# Patient Record
Sex: FEMALE | Race: WHITE | NOT HISPANIC OR LATINO | Employment: FULL TIME | URBAN - METROPOLITAN AREA
[De-identification: names, ages, dates, MRNs, and addresses within clinical notes are randomized per-mention and may not be internally consistent; named-entity substitution may affect disease eponyms.]

---

## 2017-08-17 ENCOUNTER — OFFICE VISIT (OUTPATIENT)
Dept: URGENT CARE | Facility: CLINIC | Age: 32
End: 2017-08-17
Payer: COMMERCIAL

## 2017-08-17 VITALS
BODY MASS INDEX: 32.6 KG/M2 | OXYGEN SATURATION: 96 % | DIASTOLIC BLOOD PRESSURE: 78 MMHG | HEART RATE: 78 BPM | SYSTOLIC BLOOD PRESSURE: 116 MMHG | TEMPERATURE: 97.9 F | RESPIRATION RATE: 16 BRPM | WEIGHT: 190 LBS

## 2017-08-17 DIAGNOSIS — R21 RASH: ICD-10-CM

## 2017-08-17 PROCEDURE — 99203 OFFICE O/P NEW LOW 30 MIN: CPT | Performed by: PHYSICIAN ASSISTANT

## 2017-08-17 RX ORDER — HYDROXYZINE HYDROCHLORIDE 25 MG/1
25 TABLET, FILM COATED ORAL 3 TIMES DAILY PRN
Qty: 30 TAB | Refills: 0 | Status: SHIPPED | OUTPATIENT
Start: 2017-08-17 | End: 2018-04-10

## 2017-08-17 RX ORDER — METHYLPREDNISOLONE 4 MG/1
TABLET ORAL
Qty: 21 TAB | Refills: 0 | Status: SHIPPED | OUTPATIENT
Start: 2017-08-17 | End: 2018-04-10

## 2017-08-17 ASSESSMENT — ENCOUNTER SYMPTOMS
COUGH: 0
SHORTNESS OF BREATH: 0
DIARRHEA: 0
FOCAL WEAKNESS: 0
PALPITATIONS: 0
SENSORY CHANGE: 0
NAUSEA: 0
SORE THROAT: 0
HEADACHES: 0
CHILLS: 0
MYALGIAS: 0
EYE PAIN: 0
VOMITING: 0
FATIGUE: 0
FEVER: 0
RHINORRHEA: 0
ANOREXIA: 0
TINGLING: 0

## 2017-08-17 NOTE — PROGRESS NOTES
Subjective:      Tamar Reyes is a 32 y.o. female who presents with Other            Rash  This is a new problem. Episode onset: 3-4 days ago. Patient went camping this past weekned. She got bit by mosquitoes. Has noticed more bumps developing over the past 3 days. The problem has been gradually worsening since onset. The rash is diffuse. The rash is characterized by itchiness and redness. She was exposed to an insect bite/sting. Pertinent negatives include no anorexia, congestion, cough, diarrhea, eye pain, facial edema, fatigue, fever, joint pain, rhinorrhea, shortness of breath, sore throat or vomiting. Past treatments include antihistamine. The treatment provided mild relief. There is no history of allergies, asthma, eczema or varicella.     No past medical history on file.  No past surgical history on file.    No family history on file.    Allergies   Allergen Reactions   • Penicillins Swelling       Medications, Allergies, and current problem list reviewed today in Epic      Review of Systems   Constitutional: Negative for fever, chills, malaise/fatigue and fatigue.   HENT: Negative for congestion, ear discharge, ear pain, rhinorrhea and sore throat.    Eyes: Negative for pain.   Respiratory: Negative for cough and shortness of breath.    Cardiovascular: Negative for chest pain, palpitations and leg swelling.   Gastrointestinal: Negative for nausea, vomiting, diarrhea and anorexia.   Musculoskeletal: Negative for myalgias and joint pain.   Skin: Positive for itching and rash.   Neurological: Negative for tingling, sensory change, focal weakness and headaches.     All other systems reviewed and are negative.        Objective:     /78 mmHg  Pulse 78  Temp(Src) 36.6 °C (97.9 °F)  Resp 16  Wt 86.183 kg (190 lb)  SpO2 96%     Physical Exam   Constitutional: She is oriented to person, place, and time. She appears well-developed and well-nourished. No distress.   HENT:   Head: Normocephalic and  atraumatic.   Mouth/Throat: Oropharynx is clear and moist.   Eyes: Conjunctivae are normal. No scleral icterus.   Pulmonary/Chest: Effort normal. No respiratory distress. She has no wheezes. She has no rales.   Neurological: She is alert and oriented to person, place, and time. No cranial nerve deficit.   Skin: Rash (diffuse maculpapular rash noted on hands, legs, arms, face, chest) noted. Rash is maculopapular.   Psychiatric: She has a normal mood and affect. Her behavior is normal. Judgment and thought content normal.               Assessment/Plan:     1. Rash  - most likely allergic dermatitis related to mosquito bites or possible exposure (poison oak). Rash not c/w scabies, bed bugs, or tick borne illness.  - MethylPREDNISolone (MEDROL DOSEPAK) 4 MG Tablet Therapy Pack; Take as directed on package. 1 pack.  Dispense: 21 Tab; Refill: 0  - hydrOXYzine (ATARAX) 25 MG Tab; Take 1 Tab by mouth 3 times a day as needed for Itching.  Dispense: 30 Tab; Refill: 0  Sedation side effects discussed. No Driving or alcohol with medication given.     Differential diagnoses, Supportive care, and indications for immediate follow-up discussed with patient.   Instructed to return to clinic or nearest emergency department for any change in condition, further concerns, or worsening of symptoms.    The patient demonstrated a good understanding and agreed with the treatment plan.    Rashmi Smith PA-C

## 2017-08-17 NOTE — MR AVS SNAPSHOT
Tamar Reyes   2017 10:15 AM   Office Visit   MRN: [id number]    Department:  [facility]   Dept Phone:  [phone]    Description:  Female : 1985   Provider:  Rashmi Smith PA-C           Reason for Visit     Other bumps over whole body X 4 days       Allergies as of 2017     Allergen Noted Reactions    Penicillins 2012   Swelling      You were diagnosed with     Rash   [[id number]]         Vital Signs     Blood Pressure Pulse Temperature Respirations Weight Oxygen Saturation    116/78 mmHg 78 36.6 °C (97.9 °F) 16 86.183 kg (190 lb) 96%      Basic Information     Date Of Birth Sex Race Ethnicity Preferred Language    1985 Female White Non- English      Health Maintenance        Date Due Completion Dates    IMM DTaP/Tdap/Td Vaccine (1 - Tdap) 2004 ---    PAP SMEAR 2006 ---    IMM INFLUENZA (1) 2017 ---            Current Immunizations     No immunizations on file.      Below and/or attached are the medications your provider expects you to take. Review all of your home medications and newly ordered medications with your provider and/or pharmacist. Follow medication instructions as directed by your provider and/or pharmacist. Please keep your medication list with you and share with your provider. Update the information when medications are discontinued, doses are changed, or new medications (including over-the-counter products) are added; and carry medication information at all times in the event of emergency situations     Allergies:  PENICILLINS - Swelling               Medications  Valid as of: 2017 - 11:10 AM    Generic Name Brand Name Tablet Size Instructions for use    Dicyclomine HCl (Tab) BENTYL 20 MG Take 1 Tab by mouth every 6 hours.        HydrOXYzine HCl (Tab) ATARAX 25 MG Take 1 Tab by mouth 3 times a day as needed for Itching.        MethylPREDNISolone (Tablet Therapy Pack) MEDROL DOSEPAK 4 MG Take as directed on package. 1  pack.        Norethindrone Acet-Ethinyl Est (Tab) JUNEL 1.5/30 1.5-30 MG-MCG TAKE 1 TABLET BY MOUTH EVERY DAY        Ondansetron HCl (Tab) ZOFRAN 4 MG Take 1 Tab by mouth every 6 hours.        .                 Medicines prescribed today were sent to:     [id number] - [place], [place] - [address]    [address] [place] [place] [address]    Phone: [phone] Fax: [phone]    Open 24 Hours?: No      Medication refill instructions:       If your prescription bottle indicates you have medication refills left, it is not necessary to call your provider’s office. Please contact your pharmacy and they will refill your medication.    If your prescription bottle indicates you do not have any refills left, you may request refills at any time through one of the following ways: The online [employer] system (except Urgent Care), by calling your provider’s office, or by asking your pharmacy to contact your provider’s office with a refill request. Medication refills are processed only during regular business hours and may not be available until the next business day. Your provider may request additional information or to have a follow-up visit with you prior to refilling your medication.   *Please Note: Medication refills are assigned a new Rx number when refilled electronically. Your pharmacy may indicate that no refills were authorized even though a new prescription for the same medication is available at the pharmacy. Please request the medicine by name with the pharmacy before contacting your provider for a refill.           [employer] Status: Patient Declined

## 2017-08-27 ENCOUNTER — APPOINTMENT (OUTPATIENT)
Dept: RADIOLOGY | Facility: IMAGING CENTER | Age: 32
End: 2017-08-27
Attending: PHYSICIAN ASSISTANT
Payer: COMMERCIAL

## 2017-08-27 ENCOUNTER — OFFICE VISIT (OUTPATIENT)
Dept: URGENT CARE | Facility: CLINIC | Age: 32
End: 2017-08-27
Payer: COMMERCIAL

## 2017-08-27 VITALS
DIASTOLIC BLOOD PRESSURE: 64 MMHG | SYSTOLIC BLOOD PRESSURE: 112 MMHG | HEIGHT: 64 IN | BODY MASS INDEX: 32.44 KG/M2 | OXYGEN SATURATION: 97 % | RESPIRATION RATE: 14 BRPM | TEMPERATURE: 98.6 F | HEART RATE: 77 BPM | WEIGHT: 190 LBS

## 2017-08-27 DIAGNOSIS — S89.92XA LEFT KNEE INJURY, INITIAL ENCOUNTER: ICD-10-CM

## 2017-08-27 DIAGNOSIS — S86.912A KNEE STRAIN, LEFT, INITIAL ENCOUNTER: Primary | ICD-10-CM

## 2017-08-27 PROCEDURE — 73564 X-RAY EXAM KNEE 4 OR MORE: CPT | Mod: TC | Performed by: PHYSICIAN ASSISTANT

## 2017-08-27 PROCEDURE — 99214 OFFICE O/P EST MOD 30 MIN: CPT | Performed by: PHYSICIAN ASSISTANT

## 2017-08-27 ASSESSMENT — ENCOUNTER SYMPTOMS
SENSORY CHANGE: 0
NUMBNESS: 0
FOCAL WEAKNESS: 0
TINGLING: 0

## 2017-08-27 NOTE — PATIENT INSTRUCTIONS
Knee Sprain  A knee sprain is a tear in one of the strong, fibrous tissues that connect the bones (ligaments) in your knee. The severity of the sprain depends on how much of the ligament is torn. The tear can be either partial or complete.  CAUSES   Often, sprains are a result of a fall or injury. The force of the impact causes the fibers of your ligament to stretch too much. This excess tension causes the fibers of your ligament to tear.  SIGNS AND SYMPTOMS   You may have some loss of motion in your knee. Other symptoms include:  · Bruising.  · Pain in the knee area.  · Tenderness of the knee to the touch.  · Swelling.  DIAGNOSIS   To diagnose a knee sprain, your health care provider will physically examine your knee. Your health care provider may also suggest an X-ray exam of your knee to make sure no bones are broken.  TREATMENT   If your ligament is only partially torn, treatment usually involves keeping the knee in a fixed position (immobilization) or bracing your knee for activities that require movement for several weeks. To do this, your health care provider will apply a bandage, cast, or splint to keep your knee from moving and to support your knee during movement until it heals. For a partially torn ligament, the healing process usually takes 4-6 weeks.  If your ligament is completely torn, depending on which ligament it is, you may need surgery to reconnect the ligament to the bone or reconstruct it. After surgery, a cast or splint may be applied and will need to stay on your knee for 4-6 weeks while your ligament heals.  HOME CARE INSTRUCTIONS  · Keep your injured knee elevated to decrease swelling.  · To ease pain and swelling, apply ice to the injured area:  ¨ Put ice in a plastic bag.  ¨ Place a towel between your skin and the bag.  ¨ Leave the ice on for 20 minutes, 2-3 times a day.  · Only take medicine for pain as directed by your health care provider.  · Do not leave your knee unprotected until  pain and stiffness go away (usually 4-6 weeks).  · If you have a cast or splint, do not allow it to get wet. If you have been instructed not to remove it, cover it with a plastic bag when you shower or bathe. Do not swim.  · Your health care provider may suggest exercises for you to do during your recovery to prevent or limit permanent weakness and stiffness.  SEEK IMMEDIATE MEDICAL CARE IF:  · Your cast or splint becomes damaged.  · Your pain becomes worse.  · You have significant pain, swelling, or numbness below the cast or splint.  MAKE SURE YOU:  · Understand these instructions.  · Will watch your condition.  · Will get help right away if you are not doing well or get worse.     This information is not intended to replace advice given to you by your health care provider. Make sure you discuss any questions you have with your health care provider.     Document Released: 12/18/2006 Document Revised: 01/08/2016 Document Reviewed: 07/30/2014  [employer] Interactive Patient Education ©2016 [employer]

## 2017-08-27 NOTE — PROGRESS NOTES
"Subjective:      Tamar Reyes is a 32 y.o. female who presents with Leg Injury ((L) leg x 1 day)            Patient presents with:  Leg Injury: (L) leg x 1 day          Knee Injury   This is a new problem. The current episode started yesterday. The problem occurs constantly. The problem has been gradually worsening. Pertinent negatives include no numbness. The symptoms are aggravated by bending, exertion, standing and walking. She has tried ice, position changes and rest for the symptoms. The treatment provided no relief.       Review of Systems   Musculoskeletal: Positive for joint pain (left knee).   Neurological: Negative for tingling, sensory change, focal weakness and numbness.   All other systems reviewed and are negative.         Objective:     /64   Pulse 77   Temp 37 °C (98.6 °F)   Resp 14   Ht 1.626 m (5' 4\")   Wt 86.2 kg (190 lb)   SpO2 97%   BMI 32.61 kg/m²      Physical Exam   Constitutional: She is oriented to person, place, and time. She appears well-developed and well-nourished. No distress.   HENT:   Head: Normocephalic and atraumatic.   Nose: Nose normal.   Eyes: Conjunctivae and EOM are normal. Pupils are equal, round, and reactive to light.   Neck: Normal range of motion. Neck supple.   Cardiovascular: Normal rate and intact distal pulses.    Pulmonary/Chest: Effort normal.   Musculoskeletal:        Left knee: She exhibits decreased range of motion (secondary to pain) and swelling. She exhibits no effusion, normal alignment, no LCL laxity, normal patellar mobility and no MCL laxity. Tenderness found. Lateral joint line tenderness noted. No patellar tendon tenderness noted.        Legs:  Neurological: She is alert and oriented to person, place, and time.   Skin: Skin is warm and dry.   Psychiatric: She has a normal mood and affect.   Nursing note and vitals reviewed.         Xray images viewed and interpreted by me, confirmed by radiology:    Neg for acute fracture, dislocation, " soft tissue swelling.        Assessment/Plan:     1. Knee strain, left, initial encounter  DX-KNEE COMPLETE 4+ LEFT    REFERRAL TO SPORTS MEDICINE     ACE wrap placed on left knee.     RICE TREATMENT FOR EXTREMITY INJURIES:  R-rest the extremity as much as possible while pain and swelling persist  I-ice the extremity 15 minutes every 2 hours for the first 24 hours, then 4-5 times daily   C-compress the extremity either with splint or ace wrap as directed  E-elevate the extremity to help with swelling    Motrin/Advil/Ibuprophen 600 mg every 6 hours as needed for pain or fever.    PT should follow up with PCP in 1-2 days for re-evaluation if symptoms have not improved.  Discussed red flags and reasons to return to UC or ED.  Pt and/or family verbalized understanding of diagnosis and follow up instructions and was given informational handout on diagnosis.  PT discharged.

## 2017-09-01 ENCOUNTER — OFFICE VISIT (OUTPATIENT)
Dept: MEDICAL GROUP | Facility: CLINIC | Age: 32
End: 2017-09-01
Payer: COMMERCIAL

## 2017-09-01 VITALS
HEIGHT: 64 IN | WEIGHT: 190 LBS | DIASTOLIC BLOOD PRESSURE: 68 MMHG | TEMPERATURE: 98.2 F | OXYGEN SATURATION: 96 % | BODY MASS INDEX: 32.44 KG/M2 | HEART RATE: 87 BPM | RESPIRATION RATE: 18 BRPM | SYSTOLIC BLOOD PRESSURE: 114 MMHG

## 2017-09-01 DIAGNOSIS — M25.562 LEFT LATERAL KNEE PAIN: ICD-10-CM

## 2017-09-01 PROCEDURE — 99203 OFFICE O/P NEW LOW 30 MIN: CPT | Performed by: FAMILY MEDICINE

## 2017-09-01 ASSESSMENT — PATIENT HEALTH QUESTIONNAIRE - PHQ9: CLINICAL INTERPRETATION OF PHQ2 SCORE: 0

## 2017-09-01 NOTE — PROGRESS NOTES
CHIEF COMPLAINT:  Tamar Reyes female presenting at the request of JAYLA Colón  for evaluation of knee pain.     Tamar Reyes is complaining of left knee pain  present for 1 weeks (DOI, August 26th while hiking), LEFT knee slipped going down hill landing on her right side and felt a sudden pop at the LEFT lateral knee  Pain is at the anterolateral knee  Quality is aching  Pain is non-radiating   Improved with resting  Aggravated by movement, walking, stairs  no prior problems with this area in the past   Prior Treatments: Seen at Urgent Care  Prior studies: X-Ray   Medications tried for pain include: narcotic analgesic TRAMADOL which helped some  Mechanical Symptom history: No Locking, chronic issue with knee hyperextension, otherwise no prior knee problems    REVIEW OF SYSTEMS  No Nausea, No Vomiting, No Chest Pain, No Shortness of Breath, No Dizziness, No Headache      PAST MEDICAL HISTORY:   History reviewed. No pertinent past medical history.    PMH:  has no past medical history on file.  MEDS:   Current Outpatient Prescriptions:   •  MethylPREDNISolone (MEDROL DOSEPAK) 4 MG Tablet Therapy Pack, Take as directed on package. 1 pack., Disp: 21 Tab, Rfl: 0  •  hydrOXYzine (ATARAX) 25 MG Tab, Take 1 Tab by mouth 3 times a day as needed for Itching., Disp: 30 Tab, Rfl: 0  •  ondansetron (ZOFRAN) 4 MG TABS, Take 1 Tab by mouth every 6 hours., Disp: 15 Tab, Rfl: 0  •  dicyclomine (BENTYL) 20 MG TABS, Take 1 Tab by mouth every 6 hours., Disp: 20 Tab, Rfl: 0  •  JUNEL 1.5/30 1.5-30 MG-MCG TABS, TAKE 1 TABLET BY MOUTH EVERY DAY, Disp: 90 Each, Rfl: 0  ALLERGIES:   Allergies   Allergen Reactions   • Penicillins Swelling     SURGHX: No past surgical history on file.  SOCHX:  reports that she has never smoked. She has never used smokeless tobacco.  FH: Family history was reviewed, no pertinent findings to report     PHYSICAL EXAM:  /68   Pulse 87   Temp 36.8 °C (98.2 °F)   Resp 18   Ht 1.626 m  "(5' 4\")   Wt 86.2 kg (190 lb)   SpO2 96%   BMI 32.61 kg/m²       well-developed, well-nourished in no apparent distress, alert and oriented x 3.  Gait: minimally antalgic     RIGHT Knee:  Slight Varus and No Swelling  Range of Motion Intact  Trace effusion  Patellar No tenderness and no apprehension  Medial Joint Line Tenderness and NEGATIVE Love  Lateral Joint Line Non-tender and NEGATIVE Love  Trace Laxity with Varus stress  Trace Laxity with Valgus stress  Lachman's testing is Trace  Posterior Drawer Testing is Trace  The leg is otherwise neurovascularly intact    LEFT Knee:  Slight Varus and No Swelling   Range of Motion Intact  Trace effusion  Patellar No tenderness and no apprehension  Medial Joint Line Tenderness and NEGATIVE Love  Lateral Joint Line Tenderness and NEGATIVE Love  Trace Laxity with Varus stress  Trace Laxity with Valgus stress  Lachman's testing is Trace  Posterior Drawer Testing is Trace  The leg is otherwise neurovascularly intact    Additional Findings: Tight hamstrings      1. Left lateral knee pain       Patient slipped while hiking, slipped down hill on an extended left knee and landed on her right side causing a valgus stress to her knee feeling a pop and pain in the lateral knee  Normal dialed testing in the office today  Positive tenderness along the lateral knee/distal ITB region  There does not appear to be any laxity to lateral collateral ligament    She seems to be improving  The plan is to have her continue bracing and follow-up in one week to see how she is doing  Consider formal physical therapy if symptoms persist    Hopefully we can get a better evaluation when she is having less pain    Return in about 2 weeks (around 9/15/2017).                                                      Results for orders placed in visit on 08/27/17   DX-KNEE COMPLETE 4+ LEFT    Impression 1. No acute osseous abnormality.                                          reviewed by me " with patient     Thank JAYLA Ruiz  for allowing me to participate in caring for the patient.

## 2017-09-15 ENCOUNTER — OFFICE VISIT (OUTPATIENT)
Dept: MEDICAL GROUP | Facility: CLINIC | Age: 32
End: 2017-09-15
Payer: COMMERCIAL

## 2017-09-15 VITALS
RESPIRATION RATE: 18 BRPM | OXYGEN SATURATION: 95 % | SYSTOLIC BLOOD PRESSURE: 118 MMHG | WEIGHT: 190 LBS | BODY MASS INDEX: 32.44 KG/M2 | HEIGHT: 64 IN | TEMPERATURE: 98.1 F | DIASTOLIC BLOOD PRESSURE: 76 MMHG | HEART RATE: 83 BPM

## 2017-09-15 DIAGNOSIS — M25.562 LEFT LATERAL KNEE PAIN: ICD-10-CM

## 2017-09-15 DIAGNOSIS — M62.81 QUADRICEPS WEAKNESS: ICD-10-CM

## 2017-09-15 PROCEDURE — 99213 OFFICE O/P EST LOW 20 MIN: CPT | Performed by: FAMILY MEDICINE

## 2017-09-15 NOTE — PROGRESS NOTES
"CHIEF COMPLAINT:  Tamar Reyes female presenting at the request of JAYLA Colón  for evaluation of knee pain.     Tamar Reyes is complaining of left knee pain  present for 3 weeks (DOI, August 26th while hiking), LEFT knee slipped going down hill landing on her right side and felt a sudden pop at the LEFT lateral knee  NO Pain until the end of the day (if walking excessively) at the anterolateral knee  Quality is aching  Pain is non-radiating  Feels weak  Improved with resting  Aggravated by movement, walking, stairs  no prior problems with this area in the past   Prior Treatments: Seen at Urgent Care  Prior studies: X-Ray   Mechanical Symptom history: No Locking, chronic issue with knee hyperextension, otherwise no prior knee problems    REVIEW OF SYSTEMS  No Nausea, No Vomiting, No Chest Pain, No Shortness of Breath, No Dizziness, No Headache      PAST MEDICAL HISTORY:   History reviewed. No pertinent past medical history.    PMH:  has no past medical history on file.  MEDS:   Current Outpatient Prescriptions:   •  MethylPREDNISolone (MEDROL DOSEPAK) 4 MG Tablet Therapy Pack, Take as directed on package. 1 pack., Disp: 21 Tab, Rfl: 0  •  hydrOXYzine (ATARAX) 25 MG Tab, Take 1 Tab by mouth 3 times a day as needed for Itching., Disp: 30 Tab, Rfl: 0  •  ondansetron (ZOFRAN) 4 MG TABS, Take 1 Tab by mouth every 6 hours., Disp: 15 Tab, Rfl: 0  •  dicyclomine (BENTYL) 20 MG TABS, Take 1 Tab by mouth every 6 hours., Disp: 20 Tab, Rfl: 0  •  JUNEL 1.5/30 1.5-30 MG-MCG TABS, TAKE 1 TABLET BY MOUTH EVERY DAY, Disp: 90 Each, Rfl: 0  ALLERGIES:   Allergies   Allergen Reactions   • Penicillins Swelling     SURGHX: No past surgical history on file.  SOCHX:  reports that she has never smoked. She has never used smokeless tobacco.  FH: Family history was reviewed, no pertinent findings to report     PHYSICAL EXAM:  /76   Pulse 83   Temp 36.7 °C (98.1 °F)   Resp 18   Ht 1.626 m (5' 4\")   Wt 86.2 kg " (190 lb)   SpO2 95%   BMI 32.61 kg/m²      well-developed, well-nourished in no apparent distress, alert and oriented x 3.  Gait: minimally antalgic     LEFT Knee:  Slight Varus and No Swelling   Range of Motion Intact  Trace effusion  Patellar No tenderness and no apprehension  Medial Joint Line Tenderness and NEGATIVE Love  Lateral Joint Line Non-tender and NEGATIVE Love  Trace Laxity with Varus stress  Trace Laxity with Valgus stress  Lachman's testing is Trace  Posterior Drawer Testing is Trace  The leg is otherwise neurovascularly intact    Additional Findings: Tight hamstrings      1. Left lateral knee pain  REFERRAL TO PHYSICAL THERAPY Reason for Therapy: Eval/Treat/Report   2. Quadriceps weakness          Patient slipped while hiking, slipped down hill on an extended left knee and landed on her right side causing a valgus stress to her knee feeling a pop and pain in the lateral knee  NO MORE tenderness along the lateral knee/distal ITB region  There does not appear to be any laxity to lateral collateral ligament    She is improving (85% better)  The plan is to have her start PT and f/u in 4 weeks to see how she is doing    No Follow-up on file.                                                    Results for orders placed in visit on 08/27/17   DX-KNEE COMPLETE 4+ LEFT    Impression 1. No acute osseous abnormality.                                            Thank you KULWINDER Colón.  for allowing me to participate in caring for the patient.

## 2017-10-02 ENCOUNTER — OFFICE VISIT (OUTPATIENT)
Dept: URGENT CARE | Facility: CLINIC | Age: 32
End: 2017-10-02
Payer: COMMERCIAL

## 2017-10-02 VITALS
RESPIRATION RATE: 18 BRPM | HEART RATE: 87 BPM | WEIGHT: 196 LBS | OXYGEN SATURATION: 96 % | SYSTOLIC BLOOD PRESSURE: 120 MMHG | TEMPERATURE: 98.5 F | BODY MASS INDEX: 33.46 KG/M2 | HEIGHT: 64 IN | DIASTOLIC BLOOD PRESSURE: 88 MMHG

## 2017-10-02 DIAGNOSIS — E66.9 OBESITY (BMI 30-39.9): ICD-10-CM

## 2017-10-02 DIAGNOSIS — K21.00 REFLUX ESOPHAGITIS: ICD-10-CM

## 2017-10-02 PROCEDURE — 99214 OFFICE O/P EST MOD 30 MIN: CPT | Performed by: PHYSICIAN ASSISTANT

## 2017-10-02 RX ORDER — OMEPRAZOLE 20 MG/1
20 CAPSULE, DELAYED RELEASE ORAL DAILY
Qty: 30 CAP | Refills: 0 | Status: SHIPPED | OUTPATIENT
Start: 2017-10-02

## 2017-10-02 ASSESSMENT — ENCOUNTER SYMPTOMS
VOMITING: 0
HEARTBURN: 1
SHORTNESS OF BREATH: 0
BELCHING: 1
BLOOD IN STOOL: 0
WEAKNESS: 0
CHILLS: 0
WEIGHT LOSS: 0
DIAPHORESIS: 0
GLOBUS SENSATION: 1
DIZZINESS: 0
FEVER: 0
DIARRHEA: 1
PALPITATIONS: 0
NAUSEA: 0
SORE THROAT: 1
ABDOMINAL PAIN: 0
COUGH: 0
CONSTIPATION: 0

## 2017-10-02 NOTE — PROGRESS NOTES
Subjective:      Tamar Reyes is a 32 y.o. female who presents with Lump (x 2 days/ on throat/ neck/ when swollow it hurts throat and mid back)            Gastrophageal Reflux   She complains of belching, globus sensation, heartburn and a sore throat. She reports no abdominal pain, no chest pain, no coughing or no nausea. The current episode started today. The heartburn is of moderate intensity. The heartburn does not wake her from sleep. The heartburn does not limit her activity. The heartburn changes with position. The symptoms are aggravated by caffeine. Pertinent negatives include no melena or weight loss. Risk factors include caffeine use and obesity. She has tried nothing for the symptoms.       Review of Systems   Constitutional: Negative for chills, diaphoresis, fever, malaise/fatigue and weight loss.   HENT: Positive for sore throat.    Respiratory: Negative for cough and shortness of breath.    Cardiovascular: Negative for chest pain and palpitations.   Gastrointestinal: Positive for diarrhea and heartburn. Negative for abdominal pain, blood in stool, constipation, melena, nausea and vomiting.   Neurological: Negative for dizziness and weakness.   All other systems reviewed and are negative.    PMH:  has no past medical history on file.  MEDS:   Current Outpatient Prescriptions:   •  omeprazole (PRILOSEC) 20 MG delayed-release capsule, Take 1 Cap by mouth every day., Disp: 30 Cap, Rfl: 0  •  MethylPREDNISolone (MEDROL DOSEPAK) 4 MG Tablet Therapy Pack, Take as directed on package. 1 pack., Disp: 21 Tab, Rfl: 0  •  hydrOXYzine (ATARAX) 25 MG Tab, Take 1 Tab by mouth 3 times a day as needed for Itching., Disp: 30 Tab, Rfl: 0  •  ondansetron (ZOFRAN) 4 MG TABS, Take 1 Tab by mouth every 6 hours., Disp: 15 Tab, Rfl: 0  •  dicyclomine (BENTYL) 20 MG TABS, Take 1 Tab by mouth every 6 hours., Disp: 20 Tab, Rfl: 0  •  JUNEL 1.5/30 1.5-30 MG-MCG TABS, TAKE 1 TABLET BY MOUTH EVERY DAY, Disp: 90 Each, Rfl:  "0  ALLERGIES:   Allergies   Allergen Reactions   • Penicillins Swelling     SURGHX: No past surgical history on file.  SOCHX:  reports that she has never smoked. She has never used smokeless tobacco.  FH: Family history was reviewed, no pertinent findings to report  Medications, Allergies, and current problem list reviewed today in Epic       Objective:     /88   Pulse 87   Temp 36.9 °C (98.5 °F)   Resp 18   Ht 1.626 m (5' 4\")   Wt 88.9 kg (196 lb)   SpO2 96%   Breastfeeding? No   BMI 33.64 kg/m²      Physical Exam   Constitutional: She is oriented to person, place, and time. Vital signs are normal. She appears well-developed and well-nourished.   HENT:   Head: Normocephalic and atraumatic.   Right Ear: Hearing, tympanic membrane, external ear and ear canal normal.   Left Ear: Hearing, tympanic membrane, external ear and ear canal normal.   Nose: Nose normal.   Mouth/Throat: Uvula is midline and mucous membranes are normal. No oropharyngeal exudate, posterior oropharyngeal edema, posterior oropharyngeal erythema or tonsillar abscesses.   Neck: Normal range of motion. Neck supple.   Cardiovascular: Normal rate and regular rhythm.  Exam reveals no gallop and no friction rub.    No murmur heard.  Pulmonary/Chest: Effort normal and breath sounds normal. She has no wheezes. She has no rales.   Lymphadenopathy:     She has no cervical adenopathy.   Neurological: She is alert and oriented to person, place, and time.   Skin: Skin is warm and dry.   Psychiatric: She has a normal mood and affect. Her behavior is normal. Judgment and thought content normal.   Vitals reviewed.              Assessment/Plan:   Patient is a 32-year-old female who presents with globus sensation in her throat for 2 days. She has been experiencing severe reflux recently and has pain when swallowing food. She denies food getting stuck in throat. Also was normal. She is mildly obese. ENT exam normal. Thyroid is normal in size with no " nodules palpated. She is mostly experiencing reflux esophagitis.    1. Reflux esophagitis    - REFERRAL TO GASTROENTEROLOGY  - omeprazole (PRILOSEC) 20 MG delayed-release capsule; Take 1 Cap by mouth every day.  Dispense: 30 Cap; Refill: 0    2. Obesity (BMI 30-39.9)    - Patient identified as having weight management issue.  Appropriate orders and counseling given.

## 2017-10-06 ENCOUNTER — APPOINTMENT (OUTPATIENT)
Dept: PHYSICAL THERAPY | Facility: REHABILITATION | Age: 32
End: 2017-10-06
Attending: FAMILY MEDICINE
Payer: COMMERCIAL

## 2017-10-11 ENCOUNTER — APPOINTMENT (OUTPATIENT)
Dept: PHYSICAL THERAPY | Facility: REHABILITATION | Age: 32
End: 2017-10-11
Payer: COMMERCIAL

## 2017-10-13 ENCOUNTER — APPOINTMENT (OUTPATIENT)
Dept: PHYSICAL THERAPY | Facility: REHABILITATION | Age: 32
End: 2017-10-13
Payer: COMMERCIAL

## 2017-10-17 ENCOUNTER — APPOINTMENT (OUTPATIENT)
Dept: PHYSICAL THERAPY | Facility: REHABILITATION | Age: 32
End: 2017-10-17
Payer: COMMERCIAL

## 2017-10-20 ENCOUNTER — APPOINTMENT (OUTPATIENT)
Dept: PHYSICAL THERAPY | Facility: REHABILITATION | Age: 32
End: 2017-10-20
Payer: COMMERCIAL

## 2017-12-04 ENCOUNTER — OFFICE VISIT (OUTPATIENT)
Dept: MEDICAL GROUP | Facility: CLINIC | Age: 32
End: 2017-12-04
Payer: COMMERCIAL

## 2017-12-04 VITALS
TEMPERATURE: 97.2 F | RESPIRATION RATE: 16 BRPM | HEART RATE: 82 BPM | HEIGHT: 64 IN | DIASTOLIC BLOOD PRESSURE: 78 MMHG | OXYGEN SATURATION: 96 % | BODY MASS INDEX: 33.46 KG/M2 | SYSTOLIC BLOOD PRESSURE: 112 MMHG | WEIGHT: 196 LBS

## 2017-12-04 DIAGNOSIS — M62.81 QUADRICEPS WEAKNESS: ICD-10-CM

## 2017-12-04 DIAGNOSIS — M25.562 LEFT LATERAL KNEE PAIN: ICD-10-CM

## 2017-12-04 PROCEDURE — 99213 OFFICE O/P EST LOW 20 MIN: CPT | Performed by: FAMILY MEDICINE

## 2017-12-05 NOTE — PROGRESS NOTES
CHIEF COMPLAINT:    FOLLOW up for left knee pain  present for 3 months (DOI, August 26th while hiking), LEFT knee slipped going down hill landing on her right side and felt a sudden pop at the LEFT lateral knee  NO Pain until the end of the day (if walking excessively) at the anterolateral knee  Quality is aching  Pain is non-radiating  Feels weak  Improved with resting  Aggravated by stairs  no prior problems with this area in the past   Prior studies: X-Ray   Mechanical Symptom history: No Locking, chronic issue with knee hyperextension, otherwise no prior knee problems  She did NOT attend PT as prescribed due to cancellations on PT side and patient never followed up due to personal issues    REVIEW OF SYSTEMS  No Nausea, No Vomiting, No Chest Pain, No Shortness of Breath, No Dizziness, No Headache      PAST MEDICAL HISTORY:   History reviewed. No pertinent past medical history.    PMH:  has no past medical history on file.  MEDS:   Current Outpatient Prescriptions:   •  omeprazole (PRILOSEC) 20 MG delayed-release capsule, Take 1 Cap by mouth every day., Disp: 30 Cap, Rfl: 0  •  MethylPREDNISolone (MEDROL DOSEPAK) 4 MG Tablet Therapy Pack, Take as directed on package. 1 pack., Disp: 21 Tab, Rfl: 0  •  hydrOXYzine (ATARAX) 25 MG Tab, Take 1 Tab by mouth 3 times a day as needed for Itching., Disp: 30 Tab, Rfl: 0  •  ondansetron (ZOFRAN) 4 MG TABS, Take 1 Tab by mouth every 6 hours., Disp: 15 Tab, Rfl: 0  •  dicyclomine (BENTYL) 20 MG TABS, Take 1 Tab by mouth every 6 hours., Disp: 20 Tab, Rfl: 0  •  JUNEL 1.5/30 1.5-30 MG-MCG TABS, TAKE 1 TABLET BY MOUTH EVERY DAY, Disp: 90 Each, Rfl: 0  ALLERGIES:   Allergies   Allergen Reactions   • Penicillins Swelling     SURGHX: No past surgical history on file.  SOCHX:  reports that she has never smoked. She has never used smokeless tobacco.  FH: Family history was reviewed, no pertinent findings to report     PHYSICAL EXAM:  /78   Pulse 82   Temp 36.2 °C (97.2 °F)    "Resp 16   Ht 1.626 m (5' 4\")   Wt 88.9 kg (196 lb)   SpO2 96%   BMI 33.64 kg/m²      well-developed, well-nourished in no apparent distress, alert and oriented x 3.  Gait: minimally antalgic     LEFT Knee:  Slight Varus and No Swelling   Range of Motion Intact  Trace effusion  Patellar No tenderness and no apprehension  Medial Joint Line Tenderness and NEGATIVE Love  Lateral Joint Line Non-tender and NEGATIVE Love  Trace Laxity with Varus stress  Trace Laxity with Valgus stress  Lachman's testing is Trace  Posterior Drawer Testing is Trace  The leg is otherwise neurovascularly intact    Additional Findings: Tight hamstrings, patellar apprehension on LEFT compared to right      1. Left lateral knee pain     2. Quadriceps weakness       Patient slipped while hiking, slipped down hill on an extended left knee and landed on her right side causing a valgus stress to her knee feeling a pop and pain in the lateral knee  She did not attend PT    Home exercise program provided in the office today     Return in about 4 weeks (around 1/1/2018).  To see how she is doing with her HEP for L knee                                                    Results for orders placed in visit on 08/27/17   DX-KNEE COMPLETE 4+ LEFT    Impression 1. No acute osseous abnormality.                                          Thank you KULWINDER Colón.  for allowing me to participate in caring for the patient.      "

## 2018-04-10 ENCOUNTER — OFFICE VISIT (OUTPATIENT)
Dept: URGENT CARE | Facility: CLINIC | Age: 33
End: 2018-04-10
Payer: COMMERCIAL

## 2018-04-10 VITALS
HEIGHT: 64 IN | HEART RATE: 92 BPM | OXYGEN SATURATION: 96 % | BODY MASS INDEX: 33.12 KG/M2 | WEIGHT: 194 LBS | DIASTOLIC BLOOD PRESSURE: 82 MMHG | RESPIRATION RATE: 16 BRPM | TEMPERATURE: 98.6 F | SYSTOLIC BLOOD PRESSURE: 114 MMHG

## 2018-04-10 DIAGNOSIS — J98.8 RTI (RESPIRATORY TRACT INFECTION): ICD-10-CM

## 2018-04-10 DIAGNOSIS — J32.9 RHINOSINUSITIS: Primary | ICD-10-CM

## 2018-04-10 PROCEDURE — 99214 OFFICE O/P EST MOD 30 MIN: CPT | Performed by: FAMILY MEDICINE

## 2018-04-10 RX ORDER — DOXYCYCLINE HYCLATE 100 MG
100 TABLET ORAL EVERY 12 HOURS
Qty: 14 TAB | Refills: 0 | Status: SHIPPED | OUTPATIENT
Start: 2018-04-10 | End: 2018-04-17

## 2018-04-10 RX ORDER — DEXAMETHASONE SODIUM PHOSPHATE 4 MG/ML
8 INJECTION, SOLUTION INTRA-ARTICULAR; INTRALESIONAL; INTRAMUSCULAR; INTRAVENOUS; SOFT TISSUE ONCE
Status: COMPLETED | OUTPATIENT
Start: 2018-04-10 | End: 2018-04-10

## 2018-04-10 RX ADMIN — DEXAMETHASONE SODIUM PHOSPHATE 8 MG: 4 INJECTION, SOLUTION INTRA-ARTICULAR; INTRALESIONAL; INTRAMUSCULAR; INTRAVENOUS; SOFT TISSUE at 15:57

## 2018-04-10 ASSESSMENT — ENCOUNTER SYMPTOMS
FOCAL WEAKNESS: 0
SINUS PAIN: 1
COUGH: 1
ORTHOPNEA: 0
FEVER: 0
DIZZINESS: 0
SPUTUM PRODUCTION: 0
CHILLS: 0

## 2018-04-10 NOTE — PROGRESS NOTES
"Subjective:      Tamar Reyes is a 32 y.o. female who presents with URI (x 1 week ago congestion, pressure, weakness,  phlegm is green and yellow, took afrin but made cold symptoms worse )    Chief Complaint   Patient presents with   • URI     x 1 week ago congestion, pressure, weakness,  phlegm is green and yellow, took afrin but made cold symptoms worse         - This is a very pleasant 32 y.o. female with complaints of sinus pain pressure DC x 1 wk w/ cough.           ALLERGIES:  Penicillins     PMH:  History reviewed. No pertinent past medical history.     MEDS:    Current Outpatient Prescriptions:   •  omeprazole (PRILOSEC) 20 MG delayed-release capsule, Take 1 Cap by mouth every day., Disp: 30 Cap, Rfl: 0    ** I have documented what I find to be significant in regards to past medical, social, family and surgical history  in my HPI or under PMH/PSH/FH review section, otherwise it is contributory **           HPI    Review of Systems   Constitutional: Positive for malaise/fatigue. Negative for chills and fever.   HENT: Positive for congestion and sinus pain.    Respiratory: Positive for cough. Negative for sputum production.    Cardiovascular: Negative for chest pain and orthopnea.   Neurological: Negative for dizziness and focal weakness.          Objective:     /82   Pulse 92   Temp 37 °C (98.6 °F)   Resp 16   Ht 1.626 m (5' 4\")   Wt 88 kg (194 lb)   SpO2 96%   BMI 33.30 kg/m²      Physical Exam   Constitutional: She appears well-developed. No distress.   HENT:   Head: Normocephalic and atraumatic.   Mouth/Throat: Oropharynx is clear and moist.   Eyes: Conjunctivae are normal.   Neck: Neck supple.   Cardiovascular: Regular rhythm.    No murmur heard.  Pulmonary/Chest: Effort normal and breath sounds normal. No respiratory distress.   Neurological: She is alert. She exhibits normal muscle tone.   Skin: Skin is warm and dry.   Psychiatric: She has a normal mood and affect. Judgment normal. "   Nursing note and vitals reviewed.              Assessment/Plan:         1. RTI (respiratory tract infection)               Dx & d/c instructions discussed w/ patient and/or family members. Follow up w/ Prvt Dr or here in 3-4 days if not getting better, sooner if needed,  ER if worse and UC/PCP unavailable.        Possible side effects (i.e. Rash, GI upset/constipation, sedation, elevation of BP or sugars) of any medications given discussed.

## 2019-09-16 ENCOUNTER — APPOINTMENT (RX ONLY)
Dept: URBAN - METROPOLITAN AREA CLINIC 20 | Facility: CLINIC | Age: 34
Setting detail: DERMATOLOGY
End: 2019-09-16

## 2019-09-16 DIAGNOSIS — L81.4 OTHER MELANIN HYPERPIGMENTATION: ICD-10-CM

## 2019-09-16 DIAGNOSIS — D22 MELANOCYTIC NEVI: ICD-10-CM

## 2019-09-16 DIAGNOSIS — L57.8 OTHER SKIN CHANGES DUE TO CHRONIC EXPOSURE TO NONIONIZING RADIATION: ICD-10-CM

## 2019-09-16 DIAGNOSIS — L82.1 OTHER SEBORRHEIC KERATOSIS: ICD-10-CM

## 2019-09-16 DIAGNOSIS — D18.0 HEMANGIOMA: ICD-10-CM

## 2019-09-16 DIAGNOSIS — L72.8 OTHER FOLLICULAR CYSTS OF THE SKIN AND SUBCUTANEOUS TISSUE: ICD-10-CM

## 2019-09-16 PROBLEM — D22.5 MELANOCYTIC NEVI OF TRUNK: Status: ACTIVE | Noted: 2019-09-16

## 2019-09-16 PROBLEM — D18.01 HEMANGIOMA OF SKIN AND SUBCUTANEOUS TISSUE: Status: ACTIVE | Noted: 2019-09-16

## 2019-09-16 PROCEDURE — 99203 OFFICE O/P NEW LOW 30 MIN: CPT

## 2019-09-16 PROCEDURE — ? COUNSELING

## 2019-09-16 PROCEDURE — ? ADDITIONAL NOTES

## 2019-09-16 ASSESSMENT — LOCATION SIMPLE DESCRIPTION DERM
LOCATION SIMPLE: LEFT THIGH
LOCATION SIMPLE: RIGHT FOREARM
LOCATION SIMPLE: LEFT UPPER ARM
LOCATION SIMPLE: RIGHT THIGH
LOCATION SIMPLE: LEFT FOREARM
LOCATION SIMPLE: LEFT CHEEK
LOCATION SIMPLE: RIGHT UPPER ARM
LOCATION SIMPLE: GROIN
LOCATION SIMPLE: RIGHT CHEEK
LOCATION SIMPLE: RIGHT UPPER BACK
LOCATION SIMPLE: CHEST

## 2019-09-16 ASSESSMENT — LOCATION DETAILED DESCRIPTION DERM
LOCATION DETAILED: LEFT PROXIMAL DORSAL FOREARM
LOCATION DETAILED: LEFT MEDIAL SUPERIOR CHEST
LOCATION DETAILED: LEFT ANTERIOR PROXIMAL UPPER ARM
LOCATION DETAILED: RIGHT ANTERIOR DISTAL THIGH
LOCATION DETAILED: RIGHT CENTRAL MALAR CHEEK
LOCATION DETAILED: RIGHT ANTERIOR PROXIMAL UPPER ARM
LOCATION DETAILED: RIGHT PROXIMAL DORSAL FOREARM
LOCATION DETAILED: LEFT ANTERIOR DISTAL THIGH
LOCATION DETAILED: RIGHT SUPERIOR MEDIAL UPPER BACK
LOCATION DETAILED: RIGHT INGUINAL CREASE
LOCATION DETAILED: RIGHT MID-UPPER BACK
LOCATION DETAILED: LEFT INFERIOR CENTRAL MALAR CHEEK
LOCATION DETAILED: RIGHT INFERIOR UPPER BACK

## 2019-09-16 ASSESSMENT — LOCATION ZONE DERM
LOCATION ZONE: ARM
LOCATION ZONE: FACE
LOCATION ZONE: LEG
LOCATION ZONE: TRUNK

## 2019-10-11 ENCOUNTER — APPOINTMENT (OUTPATIENT)
Dept: RADIOLOGY | Facility: IMAGING CENTER | Age: 34
End: 2019-10-11
Attending: NURSE PRACTITIONER
Payer: COMMERCIAL

## 2019-10-11 ENCOUNTER — OFFICE VISIT (OUTPATIENT)
Dept: URGENT CARE | Facility: CLINIC | Age: 34
End: 2019-10-11
Payer: COMMERCIAL

## 2019-10-11 VITALS
OXYGEN SATURATION: 97 % | BODY MASS INDEX: 34.11 KG/M2 | TEMPERATURE: 98 F | DIASTOLIC BLOOD PRESSURE: 80 MMHG | SYSTOLIC BLOOD PRESSURE: 120 MMHG | RESPIRATION RATE: 16 BRPM | HEIGHT: 64 IN | WEIGHT: 199.8 LBS | HEART RATE: 77 BPM

## 2019-10-11 DIAGNOSIS — R06.02 SHORTNESS OF BREATH: ICD-10-CM

## 2019-10-11 DIAGNOSIS — R53.83 FATIGUE, UNSPECIFIED TYPE: ICD-10-CM

## 2019-10-11 PROCEDURE — 99214 OFFICE O/P EST MOD 30 MIN: CPT | Performed by: NURSE PRACTITIONER

## 2019-10-11 PROCEDURE — 93000 ELECTROCARDIOGRAM COMPLETE: CPT | Performed by: NURSE PRACTITIONER

## 2019-10-11 PROCEDURE — 71046 X-RAY EXAM CHEST 2 VIEWS: CPT | Mod: TC | Performed by: NURSE PRACTITIONER

## 2019-10-11 NOTE — PROGRESS NOTES
Chief Complaint   Patient presents with   • Fatigue     x 1  month, fatigue getting worse today and shortness of breath today       HISTORY OF PRESENT ILLNESS: Patient is a 34 y.o. female who presents to urgent care today with concerns of fatigue and shortness of breath.  The patient notes for the past month she has had fatigue.  She is seen her PCP for this, she had blood work drawn yesterday, awaiting results.  She became concerned this afternoon when she developed shortness of breath.  She notes shortness of breath with exertion and difficulty taking a deep breath.  She denies any chest pain, leg pain, leg swelling, abdominal pain, fever, chills.  She denies any heavy vaginal bleeding.  She denies previous history of the same.  She is sexually active, has the Mirena IUD in place.    Patient Active Problem List    Diagnosis Date Noted   • Obesity (BMI 30-39.9) 10/02/2017       Allergies:Penicillins    Current Outpatient Medications Ordered in Epic   Medication Sig Dispense Refill   • omeprazole (PRILOSEC) 20 MG delayed-release capsule Take 1 Cap by mouth every day. (Patient not taking: Reported on 10/11/2019) 30 Cap 0     No current Epic-ordered facility-administered medications on file.        History reviewed. No pertinent past medical history.    Social History     Tobacco Use   • Smoking status: Never Smoker   • Smokeless tobacco: Never Used   Substance Use Topics   • Alcohol use: Yes     Comment: socc   • Drug use: Yes     Types: Marijuana     Comment: occ       No family status information on file.   History reviewed. No pertinent family history.    ROS:  Review of Systems   Constitutional: Positive for fatigue.  Negative for fever, chills, weight loss, malaise.  HENT: Negative for ear pain, nosebleeds, congestion, sore throat and neck pain.    Eyes: Negative for vision changes.   Neuro: Negative for headache, sensory changes, weakness, seizure, LOC.   Cardiovascular: Negative for chest pain, palpitations,  "orthopnea and leg swelling.   Respiratory: Positive for shortness of breath.  Negative for cough, sputum production, and wheezing.   Gastrointestinal: Negative for abdominal pain, nausea, vomiting or diarrhea.   Genitourinary: Negative for dysuria, urgency and frequency.  Musculoskeletal: Negative for falls, neck pain, back pain, joint pain, myalgias.   Skin: Negative for rash, diaphoresis.     Exam:  /80 (BP Location: Left arm, Patient Position: Sitting, BP Cuff Size: Large adult)   Pulse 77   Temp 36.7 °C (98 °F) (Temporal)   Resp 16   Ht 1.626 m (5' 4\")   Wt 90.6 kg (199 lb 12.8 oz)   SpO2 97%   General: well-nourished, well-developed female in NAD  Head: normocephalic, atraumatic  Eyes: PERRLA, no conjunctival injection, acuity grossly intact, lids normal.  Ears: normal shape and symmetry, no tenderness, no discharge. External canals are without any significant edema or erythema. Tympanic membranes are without any inflammation, no effusion. Gross auditory acuity is intact.  Nose: symmetrical without tenderness, no discharge.  Mouth/Throat: reasonable hygiene, no erythema, exudates or tonsillar enlargement.  Neck: no masses, range of motion within normal limits, no tracheal deviation. No obvious thyroid enlargement.   Lymph: no cervical adenopathy. No supraclavicular adenopathy.   Neuro: alert and oriented. Cranial nerves 1-12 grossly intact. No sensory deficit.   Cardiovascular: regular rate and rhythm. No edema.  Pulmonary: no distress. Chest is symmetrical with respiration, no wheezes, crackles, or rhonchi.   Abdomen: soft, non-tender, no guarding, no hepatosplenomegaly.  Musculoskeletal: no clubbing, appropriate muscle tone, gait is stable.  Skin: warm, dry, intact, no clubbing, no cyanosis, no rashes.   Psych: appropriate mood, affect, judgement.         Assessment/Plan:  1. Fatigue, unspecified type  DX-CHEST-2 VIEWS    POCT Pregnancy   2. Shortness of breath  EKG    DX-CHEST-2 VIEWS    POCT " "Pregnancy         12-lead study EKG interpretation, interpreted by myself.  The rhythm is sinus with a rate of 57.  There is no ectopy. There are no acute ST segment changes and no T wave abnormalities.  Interpretation: No ST segment elevation myocardial infarction.    POC preg negative    DX chest reviewed by myself, radiology reading \"No active disease.\"      The patient is a pleasant, well-appearing 34-year-old female presents clinic today with concerns of fatigue along with new onset of shortness of breath.  I do not have the patient's lab work results available to me regarding her recent fatigue work-up.  Upon examination though, the patient appears well and does not have any signs of respiratory distress or shortness of breath.  Her urine is negative in office for pregnancy today.  Her twelve-lead EKG is without any significant findings.  Her chest x-ray is also normal.  At this time, I do not have any causation for her symptoms.  I have encouraged the patient to monitor symptoms throughout the evening, if symptoms persist or worsen, she is instructed to go to the emergency department for further work-up.  In the meantime, she is instructed to follow-up with her PCP on Monday.  Supportive care, differential diagnoses, and indications for immediate follow-up discussed with patient.   Pathogenesis of diagnosis discussed including typical length and natural progression.   Instructed to return to clinic or nearest emergency department for any change in condition, further concerns, or worsening of symptoms.  Patient states understanding of the plan of care and discharge instructions.          Please note that this dictation was created using voice recognition software. I have made every reasonable attempt to correct obvious errors, but I expect that there are errors of grammar and possibly content that I did not discover before finalizing the note.      JOSE GUADALUPE Mullen.     "
